# Patient Record
Sex: FEMALE | Race: WHITE | URBAN - METROPOLITAN AREA
[De-identification: names, ages, dates, MRNs, and addresses within clinical notes are randomized per-mention and may not be internally consistent; named-entity substitution may affect disease eponyms.]

---

## 2018-06-08 ENCOUNTER — HISTORICAL (OUTPATIENT)
Dept: RADIOLOGY | Facility: HOSPITAL | Age: 63
End: 2018-06-08

## 2018-06-22 LAB
ABS NEUT (OLG): 2.72 X10(3)/MCL (ref 2.1–9.2)
BUN SERPL-MCNC: 14 MG/DL (ref 7–18)
CALCIUM SERPL-MCNC: 8.9 MG/DL (ref 8.5–10.1)
CHLORIDE SERPL-SCNC: 103 MMOL/L (ref 98–107)
CO2 SERPL-SCNC: 26 MMOL/L (ref 21–32)
CREAT SERPL-MCNC: 0.85 MG/DL (ref 0.55–1.02)
CREAT/UREA NIT SERPL: 16
ERYTHROCYTE [DISTWIDTH] IN BLOOD BY AUTOMATED COUNT: 12.6 % (ref 11.5–17)
GLUCOSE SERPL-MCNC: 121 MG/DL (ref 74–106)
HCT VFR BLD AUTO: 42.6 % (ref 37–47)
HGB BLD-MCNC: 14.5 GM/DL (ref 12–16)
MCH RBC QN AUTO: 28.9 PG (ref 27–31)
MCHC RBC AUTO-ENTMCNC: 34 GM/DL (ref 33–36)
MCV RBC AUTO: 85 FL (ref 80–94)
PLATELET # BLD AUTO: 237 X10(3)/MCL (ref 130–400)
PMV BLD AUTO: 8.7 FL (ref 7.4–10.4)
POTASSIUM SERPL-SCNC: 3.8 MMOL/L (ref 3.5–5.1)
RBC # BLD AUTO: 5.01 X10(6)/MCL (ref 4.2–5.4)
SODIUM SERPL-SCNC: 137 MMOL/L (ref 136–145)
WBC # SPEC AUTO: 5.9 X10(3)/MCL (ref 4.5–11.5)

## 2018-07-02 ENCOUNTER — HISTORICAL (OUTPATIENT)
Dept: SURGERY | Facility: HOSPITAL | Age: 63
End: 2018-07-02

## 2019-11-04 ENCOUNTER — HISTORICAL (OUTPATIENT)
Dept: INFUSION THERAPY | Facility: HOSPITAL | Age: 64
End: 2019-11-04

## 2020-06-02 ENCOUNTER — HISTORICAL (OUTPATIENT)
Dept: INFUSION THERAPY | Facility: HOSPITAL | Age: 65
End: 2020-06-02

## 2022-04-11 ENCOUNTER — HISTORICAL (OUTPATIENT)
Dept: ADMINISTRATIVE | Facility: HOSPITAL | Age: 67
End: 2022-04-11

## 2022-04-24 VITALS
SYSTOLIC BLOOD PRESSURE: 141 MMHG | DIASTOLIC BLOOD PRESSURE: 75 MMHG | BODY MASS INDEX: 27.02 KG/M2 | WEIGHT: 193 LBS | HEIGHT: 71 IN

## 2022-04-30 NOTE — OP NOTE
Patient:   Xiao Cordero            MRN: [id number]            FIN: [id number]               Age:   62 years     Sex:  Female     :  1955   Associated Diagnoses:   None   Author:   Thien Calvillo MD      OPERATIVE REPORT    DATE: 2018    SURGEON: Thien Calvillo MD    ASSISTANT: Apolonia Sosa    PREOPERATIVE DIAGNOSIS:   1.  Left shoulder partial-thickness rotator cuff tear, greater than 50%  2.  Acromioclavicular arthrosis    POSTOPERATIVE DIAGNOSIS:   1.  Left shoulder partial-thickness rotator cuff tear, greater than 50%  2.  Acromioclavicular arthrosis  3.  Subluxed and intra-articular tear of the biceps tendon    PROCEDURE:  1.  Left shoulder diagnostic arthroscopy  2.  Arthroscopic biceps tenotomy  3.  Distal clavicle resection  4.  Arthroscopic rotator cuff repair    TYPE OF ANESTHESIA:    General anesthesia with interscalene nerve block    BLOOD LOSS:  Less than 20 cc    DVT PROPHYLAXIS:  This patient placed on aspirin for DVT prophylaxis    INSTRUMENTATION:  Arthrex 5.5 mm swivel lock anchor for rotator cuff repair    HISTORY AND INDICATIONS:  Refer to last orthopedic office visit note    PROCEDURE IN DETAIL:     Diagnostic arthroscopy of shoulder    The examination under anesthesia was performed for skin defects and other contraindications and none were found.The patient was carefully placed in a lateral decubitus position. All bony prominences were padded and sterile U-drape was applied. Patients operative extremity was placed in suspension device with 7-10lbs of weight applied. The skin was prepped in normal sterile fashion. A time out was performed to confirm correct operative extremity, allergies, and antibiotic infusion. All portals were made with an 11-blade and an 18-gauge spinal needle was used to help probe and find proper alignment for the portals. After creating posterior portal, an arthroscope was inserted into the glenohumeral joint. A diagnostic arthroscopy was then performed in  this sequential order: biceps anchor, rotator interval, subscapularis tendon, superior glenohumeral ligament, middle glenohumeral ligament, inferior glenohumeral ligament, anterior and inferior capsular attachments, anterior, inferior, and posterior labrum, teres minor tendon, infraspinatus tendon, and supraspinatus tendon, and biceps tendon in the rotator interval.    The certified assistant provided critical assistance by holding instruments including the arthroscope to provide adequate visualization of the procedure, as well as assisting in wound closure.    At the end of the procedure the portals were closed with mastisol around the wound and placement of steri-strips. The patient tolerated the procedure well and taken to the recovery room in good condition.    Arthroscopic biceps Tenotomy    Viewing through posterior portal, the base of the biceps tendon visualized and subluxed. The tendon was transected with arthroscopic scissors through the anterior portal.    Distal clavicle resection    A distal clavicle resection was then carried out. Viewing from the posterior portal, the shaver was used to debride the acromioclavicular joint and associated soft tissues through the anterior portal. Once cleared of the soft tissue, the walter was used to resect the distal end of the clavicle concentrating initially upon the inferior and distal clavicular spurs. The resection was then carried superiorly to remove the upper end of the clavicle to remove approximately 5-7mm of spurs. Then the walter was used on the acromion to remove 2-3mm of bone. The resection was then measured with a probe to confirm the 8-10mm of resection.    Arthroscopic rotator cuff repair    Viewing from the posterior portal we can visualize the rotator cuff tear laterally.  The greater tuberosity attachment for the rotator cuff was debrided to good bony base.  An anchor was placed just lateral to the articular surface of the humeral head on the greater  tuberosity near the rotator cuff attachment.  Sutures were then passed through the rotator cuff just medial to the rotator cuff musculo-tendonis interval.  The sutures were then tied with a sliding locking knot.  There was good reapproximation and repair of the rotator cuff to the footprint.